# Patient Record
Sex: MALE | Race: WHITE | HISPANIC OR LATINO | Employment: FULL TIME | URBAN - METROPOLITAN AREA
[De-identification: names, ages, dates, MRNs, and addresses within clinical notes are randomized per-mention and may not be internally consistent; named-entity substitution may affect disease eponyms.]

---

## 2017-06-01 ENCOUNTER — HISTORICAL (OUTPATIENT)
Dept: PREADMISSION TESTING | Facility: HOSPITAL | Age: 27
End: 2017-06-01

## 2017-06-01 LAB
ABS NEUT (OLG): 5.59 X10(3)/MCL (ref 2.1–9.2)
BASOPHILS # BLD AUTO: 0 X10(3)/MCL (ref 0–0.2)
BASOPHILS NFR BLD AUTO: 1 %
BUN SERPL-MCNC: 12 MG/DL (ref 7–18)
CALCIUM SERPL-MCNC: 8.9 MG/DL (ref 8.5–10.1)
CHLORIDE SERPL-SCNC: 101 MMOL/L (ref 98–107)
CO2 SERPL-SCNC: 28 MMOL/L (ref 21–32)
CREAT SERPL-MCNC: 0.86 MG/DL (ref 0.7–1.3)
EOSINOPHIL # BLD AUTO: 0.3 X10(3)/MCL (ref 0–0.9)
EOSINOPHIL NFR BLD AUTO: 3 %
ERYTHROCYTE [DISTWIDTH] IN BLOOD BY AUTOMATED COUNT: 13.7 % (ref 11.5–17)
GLUCOSE SERPL-MCNC: 88 MG/DL (ref 74–106)
HCT VFR BLD AUTO: 42.4 % (ref 42–52)
HGB BLD-MCNC: 13.9 GM/DL (ref 14–18)
LYMPHOCYTES # BLD AUTO: 1.9 X10(3)/MCL (ref 0.6–4.6)
LYMPHOCYTES NFR BLD AUTO: 22 %
MCH RBC QN AUTO: 25.5 PG (ref 27–31)
MCHC RBC AUTO-ENTMCNC: 32.8 GM/DL (ref 33–36)
MCV RBC AUTO: 77.7 FL (ref 80–94)
MONOCYTES # BLD AUTO: 0.7 X10(3)/MCL (ref 0.1–1.3)
MONOCYTES NFR BLD AUTO: 8 %
NEUTROPHILS # BLD AUTO: 5.59 X10(3)/MCL (ref 2.1–9.2)
NEUTROPHILS NFR BLD AUTO: 65 %
PLATELET # BLD AUTO: 320 X10(3)/MCL (ref 130–400)
PMV BLD AUTO: 9.7 FL (ref 9.4–12.4)
POTASSIUM SERPL-SCNC: 4.5 MMOL/L (ref 3.5–5.1)
RBC # BLD AUTO: 5.46 X10(6)/MCL (ref 4.7–6.1)
SODIUM SERPL-SCNC: 139 MMOL/L (ref 136–145)
TSH SERPL-ACNC: 1.03 MIU/ML (ref 0.36–3.74)
WBC # SPEC AUTO: 8.5 X10(3)/MCL (ref 4.5–11.5)

## 2022-12-17 ENCOUNTER — HOSPITAL ENCOUNTER (EMERGENCY)
Facility: HOSPITAL | Age: 32
Discharge: HOME | End: 2022-12-18
Attending: INTERNAL MEDICINE

## 2022-12-17 DIAGNOSIS — K52.9 GASTROENTERITIS: Primary | ICD-10-CM

## 2022-12-17 DIAGNOSIS — R11.2 NAUSEA AND VOMITING, UNSPECIFIED VOMITING TYPE: ICD-10-CM

## 2022-12-17 LAB
ALBUMIN SERPL-MCNC: 4.8 G/DL (ref 3.5–5)
ALBUMIN/GLOB SERPL: 1.6 RATIO (ref 1.1–2)
ALP SERPL-CCNC: 92 UNIT/L (ref 40–150)
ALT SERPL-CCNC: 31 UNIT/L (ref 0–55)
AMORPH PHOS CRY URNS QL MICRO: ABNORMAL /HPF
APPEARANCE UR: ABNORMAL
AST SERPL-CCNC: 23 UNIT/L (ref 5–34)
BACTERIA #/AREA URNS AUTO: ABNORMAL /HPF
BASOPHILS # BLD AUTO: 0.05 X10(3)/MCL (ref 0–0.2)
BASOPHILS NFR BLD AUTO: 0.3 %
BILIRUB UR QL STRIP.AUTO: NEGATIVE MG/DL
BILIRUBIN DIRECT+TOT PNL SERPL-MCNC: 1.6 MG/DL
BUN SERPL-MCNC: 14.3 MG/DL (ref 8.9–20.6)
CALCIUM SERPL-MCNC: 10.1 MG/DL (ref 8.4–10.2)
CHLORIDE SERPL-SCNC: 103 MMOL/L (ref 98–107)
CO2 SERPL-SCNC: 29 MMOL/L (ref 22–29)
COLOR UR AUTO: YELLOW
CREAT SERPL-MCNC: 0.98 MG/DL (ref 0.73–1.18)
EOSINOPHIL # BLD AUTO: 0.1 X10(3)/MCL (ref 0–0.9)
EOSINOPHIL NFR BLD AUTO: 0.6 %
ERYTHROCYTE [DISTWIDTH] IN BLOOD BY AUTOMATED COUNT: 13.2 % (ref 11.6–14.4)
GFR SERPLBLD CREATININE-BSD FMLA CKD-EPI: >60 MLS/MIN/1.73/M2
GLOBULIN SER-MCNC: 3 GM/DL (ref 2.4–3.5)
GLUCOSE SERPL-MCNC: 111 MG/DL (ref 74–100)
GLUCOSE UR QL STRIP.AUTO: NEGATIVE MG/DL
HCT VFR BLD AUTO: 50.8 % (ref 42–52)
HGB BLD-MCNC: 16.9 GM/DL (ref 14–18)
IMM GRANULOCYTES # BLD AUTO: 0.06 X10(3)/MCL (ref 0–0.04)
IMM GRANULOCYTES NFR BLD AUTO: 0.4 %
INR BLD: 1.02 (ref 2–3)
KETONES UR QL STRIP.AUTO: NEGATIVE MG/DL
LEUKOCYTE ESTERASE UR QL STRIP.AUTO: NEGATIVE UNIT/L
LIPASE SERPL-CCNC: 12 U/L
LYMPHOCYTES # BLD AUTO: 1.1 X10(3)/MCL (ref 0.6–4.6)
LYMPHOCYTES NFR BLD AUTO: 6.6 %
MCH RBC QN AUTO: 26.2 PG
MCHC RBC AUTO-ENTMCNC: 33.3 MG/DL (ref 33–36)
MCV RBC AUTO: 78.6 FL (ref 80–94)
MONOCYTES # BLD AUTO: 0.88 X10(3)/MCL (ref 0.1–1.3)
MONOCYTES NFR BLD AUTO: 5.3 %
NEUTROPHILS # BLD AUTO: 14.38 X10(3)/MCL (ref 2.1–9.2)
NEUTROPHILS NFR BLD AUTO: 86.8 %
NITRITE UR QL STRIP.AUTO: NEGATIVE
NRBC BLD AUTO-RTO: 0 % (ref 0–1)
PH UR STRIP.AUTO: 8 [PH]
PLATELET # BLD AUTO: 285 X10(3)/MCL (ref 140–371)
PMV BLD AUTO: 10.2 FL (ref 9.4–12.4)
POTASSIUM SERPL-SCNC: 3.8 MMOL/L (ref 3.5–5.1)
PROT SERPL-MCNC: 7.8 GM/DL (ref 6.4–8.3)
PROT UR QL STRIP.AUTO: ABNORMAL MG/DL
PROTHROMBIN TIME: 13.2 SECONDS (ref 11.7–14.5)
RBC # BLD AUTO: 6.46 X10(6)/MCL (ref 4.7–6.1)
RBC #/AREA URNS AUTO: ABNORMAL /HPF
RBC UR QL AUTO: NEGATIVE UNIT/L
SODIUM SERPL-SCNC: 144 MMOL/L (ref 136–145)
SP GR UR STRIP.AUTO: 1.02
SQUAMOUS #/AREA URNS AUTO: ABNORMAL /HPF
UROBILINOGEN UR STRIP-ACNC: 1 MG/DL
WBC # SPEC AUTO: 16.6 X10(3)/MCL (ref 4.5–11.5)
WBC #/AREA URNS AUTO: ABNORMAL /HPF

## 2022-12-17 PROCEDURE — 85610 PROTHROMBIN TIME: CPT | Performed by: INTERNAL MEDICINE

## 2022-12-17 PROCEDURE — 81001 URINALYSIS AUTO W/SCOPE: CPT | Performed by: EMERGENCY MEDICINE

## 2022-12-17 PROCEDURE — 99285 EMERGENCY DEPT VISIT HI MDM: CPT | Mod: 25

## 2022-12-17 PROCEDURE — 96374 THER/PROPH/DIAG INJ IV PUSH: CPT

## 2022-12-17 PROCEDURE — 83690 ASSAY OF LIPASE: CPT | Performed by: INTERNAL MEDICINE

## 2022-12-17 PROCEDURE — 96361 HYDRATE IV INFUSION ADD-ON: CPT

## 2022-12-17 PROCEDURE — 80053 COMPREHEN METABOLIC PANEL: CPT | Performed by: INTERNAL MEDICINE

## 2022-12-17 PROCEDURE — 25000003 PHARM REV CODE 250: Performed by: INTERNAL MEDICINE

## 2022-12-17 PROCEDURE — 63600175 PHARM REV CODE 636 W HCPCS: Performed by: INTERNAL MEDICINE

## 2022-12-17 PROCEDURE — 85025 COMPLETE CBC W/AUTO DIFF WBC: CPT | Performed by: INTERNAL MEDICINE

## 2022-12-17 PROCEDURE — 81003 URINALYSIS AUTO W/O SCOPE: CPT | Performed by: EMERGENCY MEDICINE

## 2022-12-17 RX ORDER — ONDANSETRON 2 MG/ML
4 INJECTION INTRAMUSCULAR; INTRAVENOUS ONCE
Status: COMPLETED | OUTPATIENT
Start: 2022-12-17 | End: 2022-12-17

## 2022-12-17 RX ORDER — ONDANSETRON 4 MG/1
4 TABLET, ORALLY DISINTEGRATING ORAL ONCE
Status: DISCONTINUED | OUTPATIENT
Start: 2022-12-17 | End: 2022-12-17

## 2022-12-17 RX ORDER — PROMETHAZINE HYDROCHLORIDE 25 MG/ML
25 INJECTION, SOLUTION INTRAMUSCULAR; INTRAVENOUS ONCE
Status: DISCONTINUED | OUTPATIENT
Start: 2022-12-17 | End: 2022-12-17

## 2022-12-17 RX ADMIN — SODIUM CHLORIDE 1000 ML: 9 INJECTION, SOLUTION INTRAVENOUS at 11:12

## 2022-12-17 RX ADMIN — ONDANSETRON 4 MG: 2 INJECTION INTRAMUSCULAR; INTRAVENOUS at 11:12

## 2022-12-18 VITALS
RESPIRATION RATE: 18 BRPM | BODY MASS INDEX: 19.47 KG/M2 | WEIGHT: 136 LBS | OXYGEN SATURATION: 100 % | HEIGHT: 70 IN | DIASTOLIC BLOOD PRESSURE: 89 MMHG | SYSTOLIC BLOOD PRESSURE: 140 MMHG | TEMPERATURE: 99 F | HEART RATE: 88 BPM

## 2022-12-18 PROCEDURE — 25000003 PHARM REV CODE 250: Performed by: INTERNAL MEDICINE

## 2022-12-18 RX ORDER — ONDANSETRON 4 MG/1
4 TABLET, ORALLY DISINTEGRATING ORAL EVERY 6 HOURS PRN
Qty: 15 TABLET | Refills: 0 | Status: SHIPPED | OUTPATIENT
Start: 2022-12-18

## 2022-12-18 RX ORDER — MAG HYDROX/ALUMINUM HYD/SIMETH 200-200-20
30 SUSPENSION, ORAL (FINAL DOSE FORM) ORAL ONCE
Status: COMPLETED | OUTPATIENT
Start: 2022-12-18 | End: 2022-12-18

## 2022-12-18 RX ORDER — LIDOCAINE HYDROCHLORIDE 20 MG/ML
15 SOLUTION OROPHARYNGEAL ONCE
Status: COMPLETED | OUTPATIENT
Start: 2022-12-18 | End: 2022-12-18

## 2022-12-18 RX ORDER — SUCRALFATE 1 G/1
1 TABLET ORAL ONCE
Status: COMPLETED | OUTPATIENT
Start: 2022-12-18 | End: 2022-12-18

## 2022-12-18 RX ADMIN — LIDOCAINE HYDROCHLORIDE 15 ML: 20 SOLUTION ORAL at 12:12

## 2022-12-18 RX ADMIN — ALUMINA, MAGNESIA, AND SIMETHICONE ORAL SUSPENSION REGULAR STRENGTH 30 ML: 1200; 1200; 120 SUSPENSION ORAL at 12:12

## 2022-12-18 RX ADMIN — SUCRALFATE 1 G: 1 TABLET ORAL at 12:12

## 2022-12-18 NOTE — ED PROVIDER NOTES
Source of History:  Patient, no limitations    Chief complaint:  Vomiting (Pt states he has N/V and mid abd pain that started about 2hrs pta. States he vomited about 5x. Denies any diarrhea or urinary complaints. States he did not eat anything out of the ordinary- ate wings for dinner.)      HPI:  Zac Rausch is a 32 y.o. male presenting with Vomiting (Pt states he has N/V and mid abd pain that started about 2hrs pta. States he vomited about 5x. Denies any diarrhea or urinary complaints. States he did not eat anything out of the ordinary- ate wings for dinner.)         Presents with nausea and vomiting of dry heaves.  Onset of symptoms was abrupt starting a few hours ago with gradually improving course since that time. Symptoms have been occuring  all day.  Outpatient therapy with none has been attempted and symptoms have failed to improve. Symptoms are currently rated moderate. Associated signs & symptoms include mild upper abdominal pain.        Review of Systems   Constitutional symptoms:  Negative except as documented in HPI.   Skin symptoms:  Negative except as documented in HPI.   HEENT symptoms:  Negative except as documented in HPI.   Respiratory symptoms:  Negative except as documented in HPI.   Cardiovascular symptoms:  Negative except as documented in HPI.   Gastrointestinal symptoms:  Negative except as documented in HPI.    Genitourinary symptoms:  Negative except as documented in HPI.   Musculoskeletal symptoms:  Negative except as documented in HPI.   Neurologic symptoms:  Negative except as documented in HPI.   Psychiatric symptoms:  Negative except as documented in HPI.   Allergy/immunologic symptoms:  Negative except as documented in HPI.             Additional review of systems information: All other systems reviewed and otherwise negative.      Review of patient's allergies indicates:  No Known Allergies    PMH:  As per HPI and below:    History reviewed. No pertinent past medical  "history.     No family history on file.    History reviewed. No pertinent surgical history.    Social History     Tobacco Use    Smoking status: Never    Smokeless tobacco: Never   Substance Use Topics    Alcohol use: Not Currently    Drug use: Never       There is no problem list on file for this patient.       Physical Exam:    BP (!) 140/89 (BP Location: Right arm, Patient Position: Lying)   Pulse 88   Temp 98.5 °F (36.9 °C) (Oral)   Resp 18   Ht 5' 10" (1.778 m)   Wt 61.7 kg (136 lb)   SpO2 100%   BMI 19.51 kg/m²     Nursing note and vital signs reviewed.    General:  Alert, uncomfortable, vomit bag is in hand  Skin: Normal for Ethnic Origin, No cyanosis  HEENT: Normocephalic and atraumatic, Vision unchanged, Pupils symmetric, No icterus , Nasal mucosa is pink and moist  Cardiovascular:  Regular rate and rhythm, No edema  Chest Wall: No deformity, equal chest rise  Respiratory:  Lungs are clear to auscultation, respirations are non-labored.    Musculoskeletal:  No deformity, Normal perfusion to all extremities  Gastrointestinal:  Soft, Non distended, moderate tenderness RUQ  Neurological:  Alert and oriented, normal motor observed, normal speech observed.    Psychiatric:  Cooperative, appropriate mood & affect.        Labs that have been ordered have been independently reviewed and interpreted by myself.     Old Chart Reviewed.      Initial Impression/ Differential Dx:  Gastritis, viral gastroenteritis, pancreatitis, cholecystitis, ileus, small bowel obstruction, appendicitis.      MDM:      Reviewed Nurses Note.    Reviewed Pertinent old records.    Orders Placed This Encounter    X-Ray Abdomen Flat And Erect    CT Abdomen Pelvis  Without Contrast    Urinalysis, Reflex to Urine Culture Urine, Clean Catch    Urinalysis, Microscopic    CBC Auto Differential    Comprehensive Metabolic Panel    Protime-INR    Lipase    CBC with Differential    Insert peripheral IV    sodium chloride 0.9% bolus 1,000 mL 1,000 " mL    ondansetron injection 4 mg    sucralfate tablet 1 g    AND Linked Order Group     aluminum-magnesium hydroxide-simethicone 200-200-20 mg/5 mL suspension 30 mL     LIDOcaine HCl 2% oral solution 15 mL    ondansetron (ZOFRAN-ODT) 4 MG TbDL                    Labs Reviewed   URINALYSIS, REFLEX TO URINE CULTURE - Abnormal; Notable for the following components:       Result Value    Appearance, UA Cloudy (*)     Protein, UA Trace (*)     All other components within normal limits   URINALYSIS, MICROSCOPIC - Abnormal; Notable for the following components:    Bacteria, UA Many (*)     Amorphous Phosphate Crystals, UA Many (*)     All other components within normal limits   COMPREHENSIVE METABOLIC PANEL - Abnormal; Notable for the following components:    Glucose Level 111 (*)     Bilirubin Total 1.6 (*)     All other components within normal limits   PROTIME-INR - Abnormal; Notable for the following components:    INR 1.02 (*)     All other components within normal limits   CBC WITH DIFFERENTIAL - Abnormal; Notable for the following components:    WBC 16.6 (*)     RBC 6.46 (*)     MCV 78.6 (*)     Neut # 14.38 (*)     IG# 0.06 (*)     All other components within normal limits   LIPASE - Normal   CBC W/ AUTO DIFFERENTIAL    Narrative:     The following orders were created for panel order CBC Auto Differential.  Procedure                               Abnormality         Status                     ---------                               -----------         ------                     CBC with Differential[[id number]]        Abnormal            Final result                 Please view results for these tests on the individual orders.          CT Abdomen Pelvis  Without Contrast         X-Ray Abdomen Flat And Erect   ED Interpretation   Defer to CT           Admission on 12/17/2022, Discharged on 12/18/2022   Component Date Value Ref Range Status    Color, UA 12/17/2022 Yellow  Yellow, Light-Yellow, Dark Yellow, Dia,  Straw Final    Appearance, UA 12/17/2022 Cloudy (A)  Clear Final    Specific Gravity, UA 12/17/2022 1.020   Final    pH, UA 12/17/2022 8.0  5.0 - 8.5 Final    Protein, UA 12/17/2022 Trace (A)  Negative mg/dL Final    Glucose, UA 12/17/2022 Negative  Negative, Normal mg/dL Final    Ketones, UA 12/17/2022 Negative  Negative mg/dL Final    Blood, UA 12/17/2022 Negative  Negative unit/L Final    Bilirubin, UA 12/17/2022 Negative  Negative mg/dL Final    Urobilinogen, UA 12/17/2022 1.0  0.2, 1.0, Normal mg/dL Final    Nitrites, UA 12/17/2022 Negative  Negative Final    Leukocyte Esterase, UA 12/17/2022 Negative  Negative unit/L Final    Bacteria, UA 12/17/2022 Many (A)  None Seen, Rare, Occasional /HPF Final    Amorphous Phosphate Crystals, UA 12/17/2022 Many (A)  None Seen /HPF Final    RBC, UA 12/17/2022 None Seen  None Seen, 0-2, 3-5, 0-5 /HPF Final    WBC, UA 12/17/2022 None Seen  None Seen, 0-2, 3-5, 0-5 /HPF Final    Squamous Epithelial Cells, UA 12/17/2022 None Seen  None Seen, Rare, Occasional, Occ /HPF Final    Sodium Level 12/17/2022 144  136 - 145 mmol/L Final    Potassium Level 12/17/2022 3.8  3.5 - 5.1 mmol/L Final    Chloride 12/17/2022 103  98 - 107 mmol/L Final    Carbon Dioxide 12/17/2022 29  22 - 29 mmol/L Final    Glucose Level 12/17/2022 111 (H)  74 - 100 mg/dL Final    Blood Urea Nitrogen 12/17/2022 14.3  8.9 - 20.6 mg/dL Final    Creatinine 12/17/2022 0.98  0.73 - 1.18 mg/dL Final    Calcium Level Total 12/17/2022 10.1  8.4 - 10.2 mg/dL Final    Protein Total 12/17/2022 7.8  6.4 - 8.3 gm/dL Final    Albumin Level 12/17/2022 4.8  3.5 - 5.0 g/dL Final    Globulin 12/17/2022 3.0  2.4 - 3.5 gm/dL Final    Albumin/Globulin Ratio 12/17/2022 1.6  1.1 - 2.0 ratio Final    Bilirubin Total 12/17/2022 1.6 (H)  <=1.5 mg/dL Final    Alkaline Phosphatase 12/17/2022 92  40 - 150 unit/L Final    Alanine Aminotransferase 12/17/2022 31  0 - 55 unit/L Final    Aspartate Aminotransferase 12/17/2022 23  5 - 34 unit/L  Final    eGFR 12/17/2022 >60  mls/min/1.73/m2 Final    PT 12/17/2022 13.2  11.7 - 14.5 seconds Final    INR 12/17/2022 1.02 (L)  2.00 - 3.00 Final    Lipase Level 12/17/2022 12  <=60 U/L Final    WBC 12/17/2022 16.6 (H)  4.5 - 11.5 x10(3)/mcL Final    RBC 12/17/2022 6.46 (H)  4.70 - 6.10 x10(6)/mcL Final    Hgb 12/17/2022 16.9  14.0 - 18.0 gm/dL Final    Hct 12/17/2022 50.8  42.0 - 52.0 % Final    MCV 12/17/2022 78.6 (L)  80.0 - 94.0 fL Final    MCH 12/17/2022 26.2  pg Final    MCHC 12/17/2022 33.3  33.0 - 36.0 mg/dL Final    RDW 12/17/2022 13.2  11.6 - 14.4 % Final    Platelet 12/17/2022 285  140 - 371 x10(3)/mcL Final    MPV 12/17/2022 10.2  9.4 - 12.4 fL Final    Neut % 12/17/2022 86.8  % Final    Lymph % 12/17/2022 6.6  % Final    Mono % 12/17/2022 5.3  % Final    Eos % 12/17/2022 0.6  % Final    Basophil % 12/17/2022 0.3  % Final    Lymph # 12/17/2022 1.10  0.6 - 4.6 x10(3)/mcL Final    Neut # 12/17/2022 14.38 (H)  2.1 - 9.2 x10(3)/mcL Final    Mono # 12/17/2022 0.88  0.1 - 1.3 x10(3)/mcL Final    Eos # 12/17/2022 0.10  0 - 0.9 x10(3)/mcL Final    Baso # 12/17/2022 0.05  0 - 0.2 x10(3)/mcL Final    IG# 12/17/2022 0.06 (H)  0 - 0.04 x10(3)/mcL Final    IG% 12/17/2022 0.4  % Final    NRBC% 12/17/2022 0.0  0 - 1 % Final       Imaging Results              CT Abdomen Pelvis  Without Contrast (Preliminary result)  Result time 12/18/22 00:24:46      Preliminary result by Adeel Greenwood MD (12/18/22 00:24:46)                   Narrative:    START OF REPORT:  Technique: CT of the abdomen and pelvis was performed with axial images as well as sagittal and coronal reconstruction images without intravenous contrast renal stone protocol.    Comparison: None available.    Clinical History: Mid abdominal pain and vomiting x 3 hours.    Dosage Information: Automated Exposure Control was utilized.    Findings:  Thorax:  Lungs: The visualized lung bases appear unremarkable.  Pleura: No effusions or thickening are seen.  Heart:  The heart size is within normal limits.  Abdomen:  Abdominal Wall: No abdominal wall pathology is seen.  Liver: The liver appears unremarkable.  Biliary System: No intrahepatic or extrahepatic biliary duct dilatation is seen.  Gallbladder: The gallbladder appears unremarkable.  Pancreas: The pancreas appears unremarkable.  Spleen: The spleen appears unremarkable.  Adrenals: The adrenal glands appear unremarkable.  Kidneys: The kidneys appear unremarkable with no stones cysts masses or hydronephrosis.  Aorta: The abdominal aorta appears unremarkable.  IVC: Unremarkable.  Bowel:  Esophagus: The visualized esophagus appears unremarkable.  Stomach: The stomach appears unremarkable.  Duodenum: Unremarkable appearing duodenum.  Small Bowel: The small bowel appears unremarkable.  Colon: Nondistended.  Appendix: The appendix appears unremarkable.  Peritoneum: No intraperitoneal free air or ascites is seen.    Pelvis:  Bladder: The bladder appears unremarkable.  Male:  Prostate gland: The prostate gland appears unremarkable.    Bony structures:  Dorsal Spine: The visualized dorsal spine appears unremarkable.      Impression:  1. No renal / ureteral calculus or ureteral obstruction.  2. No acute intraabdominal or pelvic pathology is identified. Details and other findings as discussed above.                          Preliminary result by edPULSE, Rad Results In (12/18/22 00:24:46)                   Narrative:    START OF REPORT:  Technique: CT of the abdomen and pelvis was performed with axial images as well as sagittal and coronal reconstruction images without intravenous contrast renal stone protocol.    Comparison: None available.    Clinical History: Mid abdominal pain and vomiting x 3 hours.    Dosage Information: Automated Exposure Control was utilized.    Findings:  Thorax:  Lungs: The visualized lung bases appear unremarkable.  Pleura: No effusions or thickening are seen.  Heart: The heart size is within normal  limits.  Abdomen:  Abdominal Wall: No abdominal wall pathology is seen.  Liver: The liver appears unremarkable.  Biliary System: No intrahepatic or extrahepatic biliary duct dilatation is seen.  Gallbladder: The gallbladder appears unremarkable.  Pancreas: The pancreas appears unremarkable.  Spleen: The spleen appears unremarkable.  Adrenals: The adrenal glands appear unremarkable.  Kidneys: The kidneys appear unremarkable with no stones cysts masses or hydronephrosis.  Aorta: The abdominal aorta appears unremarkable.  IVC: Unremarkable.  Bowel:  Esophagus: The visualized esophagus appears unremarkable.  Stomach: The stomach appears unremarkable.  Duodenum: Unremarkable appearing duodenum.  Small Bowel: The small bowel appears unremarkable.  Colon: Nondistended.  Appendix: The appendix appears unremarkable.  Peritoneum: No intraperitoneal free air or ascites is seen.    Pelvis:  Bladder: The bladder appears unremarkable.  Male:  Prostate gland: The prostate gland appears unremarkable.    Bony structures:  Dorsal Spine: The visualized dorsal spine appears unremarkable.      Impression:  1. No renal / ureteral calculus or ureteral obstruction.  2. No acute intraabdominal or pelvic pathology is identified. Details and other findings as discussed above.                                         X-Ray Abdomen Flat And Erect (Preliminary result)  Result time 12/18/22 00:08:59      ED Interpretation by Cal Delcid DO (12/18/22 00:08:59, [facility] - Emergency Dept, Emergency Medicine)    Defer to CT                                                   ED Course as of 12/18/22 0036   Sat Dec 17, 2022   2302 WBC(!): 16.6 [MP]      ED Course User Index  [MP] Cal Delcid DO                        Diagnostic Impression:    1. Gastroenteritis    2. Nausea and vomiting, unspecified vomiting type         ED Disposition Condition    Discharge Stable             Follow-up Information       [facility] - Emergency Dept.    Specialty: Emergency Medicine  Why: If symptoms worsen  Contact information:  [address]  [facility] [id number] [phone]                            ED Prescriptions       Medication Sig Dispense Start Date End Date Auth. Provider    ondansetron (ZOFRAN-ODT) 4 MG TbDL Take 1 tablet (4 mg total) by mouth every 6 (six) hours as needed (nausea). 15 tablet 12/18/2022 -- Cal Delcid DO          Follow-up Information       Follow up With Specialties Details Why Contact Info    [facility] - Emergency Dept Emergency Medicine  If symptoms worsen [address]  [facility] [id number] [phone]             Cal Delcid DO  12/18/22 0036